# Patient Record
Sex: MALE | Race: BLACK OR AFRICAN AMERICAN | Employment: UNEMPLOYED | URBAN - METROPOLITAN AREA
[De-identification: names, ages, dates, MRNs, and addresses within clinical notes are randomized per-mention and may not be internally consistent; named-entity substitution may affect disease eponyms.]

---
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
## 2020-10-07 ENCOUNTER — HOSPITAL ENCOUNTER (OUTPATIENT)
Dept: NUTRITION | Age: 20
Discharge: HOME | End: 2020-10-07
Payer: COMMERCIAL

## 2020-10-07 PROCEDURE — 97802 MEDICAL NUTRITION INDIV IN: CPT

## 2020-10-07 NOTE — PROGRESS NOTES
[address] [place] [address]     Nutrition Assessment  Medical Nutrition Therapy   Outpatient Initial Evaluation         Patient Name: Andreina Jim. : 2000   Treatment Diagnosis: Type 1 diabetes  Weight loss   Referral Source: Alisa Kumar NP Start of Care [facility]): 10/7/2020     Gender: male Age: 23 y.o. Ht: 72 in Wt: 152  lb  kg   BMI: 21 BMR   Male 1750 BMR Female      Past Medical History:  Type 1 diabetes  On the spectrum. Pertinent Medications:   Insulin     Biochemical Data:   No results found for: HBA1C, HGBE8, UKA4BLSK, JTE6TGEN  No results found for: NA, K, CL, CO2, AGAP, GLU, BUN, CREA, BUCR, GFRAA, GFRNA, CA, TBIL, TBILI, AP, TP, ALB, GLOB, AGRAT, ALT, AST  No results found for: CHOL, CHOLPOCT, CHOLX, CHLST, CHOLV, HDL, HDLPOC, HDLP, LDL, LDLCPOC, LDLC, DLDLP, VLDLC, VLDL, TGLX, TRIGL, TRIGP, TGLPOCT, CHHD, CHHDX  No results found for: ALT, AST, GGT, GGTP, AP, APIT, APX, CBIL, TBIL, TBILI  No results found for: MIKE, CREAPOC, ACREA, CREA, REFC3, REFC4  No results found for: BUN, BUNPOC, IBUN, MBUNV, BUNV  No results found for: MCACR, MCA1, MCA2, MCA3, MCAU, MCAU2, MCALPOCT     Assessment:    Patient is a 23year old male diagnosed with type 1 diabetes in his senior year of high school. He visits the RD with his mother who would like help with balancing carbohydrate intake with protein intake to give more balanced BS readings. Food & Nutrition: Mother states that patient eats about every two hours a combination of protein and carbohydrates. The RD notes that he eats many simple sugars. The mother states that after she goes to bed, because he is still hungry, he will choose more simple carbohydrates like Life cereal and milk, because he is still hungry.          Estimate Needs   Calories: 2500 Protein:  Carbs: 190 Fat: 112-140   Kcal/day  g/day  g/day  g/day                            Nutrition Diagnosis Patient is a type 1 diabetic and is eating many simple sugars and therefore having BS above 200 frequently. Nutrition Intervention &  Education: RD provided a meal plan with  A good balance of carbohydrate and protein and healthy fats to help with BS control through out the day. Handouts Provided: [x]  Carbohydrates  [x]  Protein  []  Non-starchy Vegatbles  [x]  Food Label  [x]  Meal and Snack Ideas  [x]  Food Journals [x]  Diabetes  []  Cholesterol  []  Sodium  [x]  Gen Nutr Guidelines  []  SBGM Guidelines  []  Others:   Information Reviewed with:    Readiness to Change Stage: []  Pre-contemplative    []  Contemplative  []  Preparation               [x]  Action                  []  Maintenance   Potential Barriers to Learning: []  Decline in memory    []  Language barrier   [x]  Other: [x]  Emotional                  []  Limited mobility  []  Lack of motivation     [x] Vision, hearing or cognitive impairment   Expected Compliance:  Fair due to reliance on mom. Nutritional Goal - To promote lifestyle changes to result in:    []  Weight loss  [x]  Improved diabetic control  []  Decreased cholesterol levels  []  Decreased blood pressure  []  Weight maintenance []  Preventing any interactions associated with food allergies  [x]  Adequate weight gain toward goal weight  []  Other:        Patient Goals:   Better BS control with weight gain. Dietitian Signature: Agustin Dunne RD Date: 10/7/2020   Follow-up: Scheduled.  Time: 7:36 PM

## 2020-11-11 ENCOUNTER — APPOINTMENT (OUTPATIENT)
Dept: NUTRITION | Age: 20
End: 2020-11-11

## 2020-11-18 ENCOUNTER — HOSPITAL ENCOUNTER (OUTPATIENT)
Dept: NUTRITION | Age: 20
End: 2020-11-18